# Patient Record
Sex: FEMALE | Race: WHITE | NOT HISPANIC OR LATINO | Employment: FULL TIME | URBAN - METROPOLITAN AREA
[De-identification: names, ages, dates, MRNs, and addresses within clinical notes are randomized per-mention and may not be internally consistent; named-entity substitution may affect disease eponyms.]

---

## 2017-03-19 ENCOUNTER — OFFICE VISIT (OUTPATIENT)
Dept: URGENT CARE | Facility: URGENT CARE | Age: 62
End: 2017-03-19
Payer: COMMERCIAL

## 2017-03-19 VITALS
SYSTOLIC BLOOD PRESSURE: 137 MMHG | DIASTOLIC BLOOD PRESSURE: 74 MMHG | TEMPERATURE: 101.1 F | OXYGEN SATURATION: 94 % | HEART RATE: 78 BPM

## 2017-03-19 DIAGNOSIS — R68.89 FLU-LIKE SYMPTOMS: Primary | ICD-10-CM

## 2017-03-19 LAB
DEPRECATED S PYO AG THROAT QL EIA: NORMAL
FLUAV+FLUBV AG SPEC QL: ABNORMAL
FLUAV+FLUBV AG SPEC QL: NEGATIVE
MICRO REPORT STATUS: NORMAL
SPECIMEN SOURCE: ABNORMAL
SPECIMEN SOURCE: NORMAL

## 2017-03-19 PROCEDURE — 87804 INFLUENZA ASSAY W/OPTIC: CPT | Performed by: FAMILY MEDICINE

## 2017-03-19 PROCEDURE — 87081 CULTURE SCREEN ONLY: CPT | Performed by: FAMILY MEDICINE

## 2017-03-19 PROCEDURE — 99203 OFFICE O/P NEW LOW 30 MIN: CPT | Performed by: FAMILY MEDICINE

## 2017-03-19 PROCEDURE — 87880 STREP A ASSAY W/OPTIC: CPT | Performed by: FAMILY MEDICINE

## 2017-03-19 RX ORDER — ONDANSETRON 4 MG/1
4 TABLET, FILM COATED ORAL EVERY 8 HOURS PRN
Qty: 18 TABLET | Refills: 0 | Status: SHIPPED | OUTPATIENT
Start: 2017-03-19

## 2017-03-19 RX ORDER — OSELTAMIVIR PHOSPHATE 75 MG/1
75 CAPSULE ORAL 2 TIMES DAILY
Qty: 10 CAPSULE | Refills: 0 | Status: SHIPPED | OUTPATIENT
Start: 2017-03-19

## 2017-03-19 NOTE — NURSING NOTE
"Chief Complaint   Patient presents with     Musculoskeletal Problem     Nausea, Fever, Cough, Chills       Initial /74 (BP Location: Right arm, Patient Position: Supine, Cuff Size: Adult Small)  Pulse 78  Temp 101.1  F (38.4  C) (Oral)  SpO2 94% Estimated body mass index is 18.01 kg/(m^2) as calculated from the following:    Height as of 5/28/08: 5' 7\" (1.702 m).    Weight as of 7/23/08: 115 lb (52.2 kg).  Medication Reconciliation: complete     Layne Ozuna CMA (AAMA)        "

## 2017-03-19 NOTE — PROGRESS NOTES
SUBJECTIVE:                                                    Mesha Knight is a 61 year old female who presents to clinic today for the following health issues:      RESPIRATORY SYMPTOMS      Duration: x5 days    Description  nasal congestion, cough, fever, chills, headache, fatigue/malaise, myalgias and nausea    Severity: severe    Accompanying signs and symptoms: None    History (predisposing factors):  none    Precipitating or alleviating factors: None    Therapies tried and outcome:             Problem list and histories reviewed & adjusted, as indicated.  Additional history: as documented    Patient Active Problem List   Diagnosis     iamOTHER BACK SYMPTOMS     Enthesopathy of knee     High cholesterol     Past Surgical History   Procedure Laterality Date     Hysterectomy         Social History   Substance Use Topics     Smoking status: Never Smoker     Smokeless tobacco: Not on file     Alcohol use 0.5 oz/week     1 drink(s) per week     Family History   Problem Relation Age of Onset     Obesity Mother       72     CEREBROVASCULAR DISEASE Father       94           Reviewed and updated as needed this visit by clinical staff  Allergies       Reviewed and updated as needed this visit by Provider         ROS:  Constitutional, HEENT, cardiovascular, pulmonary, gi and gu systems are negative, except as otherwise noted.    OBJECTIVE:                                                    /74 (BP Location: Right arm, Patient Position: Supine, Cuff Size: Adult Small)  Pulse 78  Temp 101.1  F (38.4  C) (Oral)  SpO2 94%  There is no height or weight on file to calculate BMI.  GENERAL: alert and moderate distress  HENT: normal cephalic/atraumatic, ear canals and TM's normal, nose and mouth without ulcers or lesions, oral mucous membranes moist and tonsillar erythema  NECK: bilateral anterior cervical adenopathy, no asymmetry, masses, or scars and thyroid normal to palpation  RESP: lungs clear to  auscultation - no rales, rhonchi or wheezes  CV: regular rate and rhythm, normal S1 S2, no S3 or S4, no murmur, click or rub, no peripheral edema and peripheral pulses strong  ABDOMEN: soft, nontender, no hepatosplenomegaly, no masses and bowel sounds normal  MS: no gross musculoskeletal defects noted, no edema    Diagnostic Test Results:  Results for orders placed or performed in visit on 03/19/17   Influenza A/B antigen   Result Value Ref Range    Influenza A/B Agn Specimen Nasal     Influenza A Negative NEG    Influenza B (A) NEG     Positive   Test results must be correlated with clinical data. If necessary, results   should be confirmed by a molecular assay or viral culture.     Rapid strep screen   Result Value Ref Range    Specimen Description Throat     Rapid Strep A Screen       NEGATIVE: No Group A streptococcal antigen detected by immunoassay, await   culture report.      Micro Report Status FINAL 03/19/2017           ASSESSMENT/PLAN:                                                              ICD-10-CM    1. Flu-like symptoms R68.89 Influenza A/B antigen     Rapid strep screen     Beta strep group A culture     ondansetron (ZOFRAN) 4 MG tablet     oseltamivir (TAMIFLU) 75 MG capsule       2. Nausea  3. Influenza B     61-year-old female moderately ill.  There  Are symptoms of bacterial infection but at this time I would only treat as flu and not secondary infection.    She is still having nausea and I would treat this symptom.            Positive for influenza B.    Xavier Aguirre MD  [facility]

## 2017-03-19 NOTE — MR AVS SNAPSHOT
"              After Visit Summary   3/19/2017    Mesha Knight    MRN: [id number]           Patient Information     Date Of Birth          1955        Visit Information        Provider Department      3/19/2017 9:30 AM Xavier Aguirre MD [facility]        Today's Diagnoses     Flu-like symptoms    -  1       Follow-ups after your visit        Who to contact     If you have questions or need follow up information about today's clinic visit or your schedule please contact [facility] directly at [phone].  Normal or non-critical lab and imaging results will be communicated to you by [employer], letter or phone within 4 business days after the clinic has received the results. If you do not hear from us within 7 days, please contact the clinic through [employer] or phone. If you have a critical or abnormal lab result, we will notify you by phone as soon as possible.  Submit refill requests through [employer] or call your pharmacy and they will forward the refill request to us. Please allow 3 business days for your refill to be completed.          Additional Information About Your Visit        MyChart Information     [employer] lets you send messages to your doctor, view your test results, renew your prescriptions, schedule appointments and more. To sign up, go to [email or web address] . Click on \"Log in\" on the left side of the screen, which will take you to the Welcome page. Then click on \"Sign up Now\" on the right side of the page.     You will be asked to enter the access code listed below, as well as some personal information. Please follow the directions to create your username and password.     Your access code is: [email or web address]  Expires: 2017  6:04 PM     Your access code will  in 90 days. If you need help or a new code, please call your [place] clinic or [phone].        Care EveryWhere ID     This is your Care EveryWhere ID. This could be used by other " organizations to access your [facility] medical records  [id number]        Your Vitals Were     Pulse Temperature Pulse Oximetry             78 101.1  F (38.4  C) (Oral) 94%          Blood Pressure from Last 3 Encounters:   03/19/17 137/74   07/23/08 108/62   05/28/08 110/70    Weight from Last 3 Encounters:   07/23/08 115 lb (52.2 kg)   05/28/08 115 lb (52.2 kg)              We Performed the Following     Beta strep group A culture     Influenza A/B antigen     Rapid strep screen          Today's Medication Changes          These changes are accurate as of: 3/19/17 11:59 PM.  If you have any questions, ask your nurse or doctor.               Start taking these medicines.        Dose/Directions    ondansetron 4 MG tablet   Commonly known as:  ZOFRAN   Used for:  Flu-like symptoms        Dose:  4 mg   Take 1 tablet (4 mg) by mouth every 8 hours as needed for nausea   Quantity:  18 tablet   Refills:  0       oseltamivir 75 MG capsule   Commonly known as:  TAMIFLU   Used for:  Flu-like symptoms        Dose:  75 mg   Take 1 capsule (75 mg) by mouth 2 times daily   Quantity:  10 capsule   Refills:  0            Where to get your medicines      These medications were sent to [place] [address] [place] [facility] [address]  [address] [facility] [address]     Phone:  [phone]     ondansetron 4 MG tablet    oseltamivir 75 MG capsule                Primary Care Provider Office Phone # Fax #    Pawan Sims [phone]       [address] [address]        Thank you!     Thank you for choosing [facility]  for your care. Our goal is always to provide you with excellent care. Hearing back from our patients is one way we can continue to improve our services. Please take a few minutes to complete the written survey that you may receive in the mail after your visit with us. Thank you!             Your Updated Medication List -  Protect others around you: Learn how to safely use, store and throw away your medicines at www.disposemymeds.org.          This list is accurate as of: 3/19/17 11:59 PM.  Always use your most recent med list.                   Brand Name Dispense Instructions for use    CITRUCEL PO      1 TABLET TWICE DAILY AS NEEDED       GLUCOSAMINE CHONDROITIN Tabs      2 tabs per day       LYSINE      2 tabs per day       MULTI-VITAMIN PO      1 TABLET DAILY       OMEGA-3 CAPS   OR      2 tabs per day       ondansetron 4 MG tablet    ZOFRAN    18 tablet    Take 1 tablet (4 mg) by mouth every 8 hours as needed for nausea       oseltamivir 75 MG capsule    TAMIFLU    10 capsule    Take 1 capsule (75 mg) by mouth 2 times daily       RESTASIS OP      None Entered       TUMS PO      4 tabs bid       ZOLOFT PO      75mg per day

## 2017-03-21 LAB
BACTERIA SPEC CULT: NORMAL
MICRO REPORT STATUS: NORMAL
SPECIMEN SOURCE: NORMAL

## 2017-09-29 ENCOUNTER — HOSPITAL ENCOUNTER (OUTPATIENT)
Dept: MAMMOGRAPHY | Facility: CLINIC | Age: 62
Discharge: HOME | End: 2017-09-29
Attending: OBSTETRICS & GYNECOLOGY | Admitting: OBSTETRICS & GYNECOLOGY
Payer: COMMERCIAL

## 2017-09-29 DIAGNOSIS — Z12.31 VISIT FOR SCREENING MAMMOGRAM: ICD-10-CM

## 2017-09-29 PROCEDURE — 77063 BREAST TOMOSYNTHESIS BI: CPT

## 2017-09-29 PROCEDURE — G0202 SCR MAMMO BI INCL CAD: HCPCS

## 2018-10-08 ENCOUNTER — HOSPITAL ENCOUNTER (OUTPATIENT)
Dept: MAMMOGRAPHY | Facility: CLINIC | Age: 63
Discharge: HOME | End: 2018-10-08
Attending: OBSTETRICS & GYNECOLOGY | Admitting: OBSTETRICS & GYNECOLOGY
Payer: COMMERCIAL

## 2018-10-08 DIAGNOSIS — Z12.31 VISIT FOR SCREENING MAMMOGRAM: ICD-10-CM

## 2018-10-08 PROCEDURE — 77063 BREAST TOMOSYNTHESIS BI: CPT

## 2019-07-06 ENCOUNTER — OFFICE VISIT (OUTPATIENT)
Dept: URGENT CARE | Facility: URGENT CARE | Age: 64
End: 2019-07-06
Payer: COMMERCIAL

## 2019-07-06 VITALS
OXYGEN SATURATION: 99 % | SYSTOLIC BLOOD PRESSURE: 112 MMHG | DIASTOLIC BLOOD PRESSURE: 72 MMHG | TEMPERATURE: 98 F | HEART RATE: 78 BPM

## 2019-07-06 DIAGNOSIS — R31.29 OTHER MICROSCOPIC HEMATURIA: ICD-10-CM

## 2019-07-06 DIAGNOSIS — N30.01 ACUTE CYSTITIS WITH HEMATURIA: Primary | ICD-10-CM

## 2019-07-06 LAB
ALBUMIN UR-MCNC: NEGATIVE MG/DL
APPEARANCE UR: CLEAR
BACTERIA #/AREA URNS HPF: ABNORMAL /HPF
BILIRUB UR QL STRIP: NEGATIVE
COLOR UR AUTO: YELLOW
GLUCOSE UR STRIP-MCNC: NEGATIVE MG/DL
HGB UR QL STRIP: ABNORMAL
KETONES UR STRIP-MCNC: NEGATIVE MG/DL
LEUKOCYTE ESTERASE UR QL STRIP: ABNORMAL
NITRATE UR QL: NEGATIVE
PH UR STRIP: 6.5 PH (ref 5–7)
RBC #/AREA URNS AUTO: ABNORMAL /HPF
SOURCE: ABNORMAL
SP GR UR STRIP: 1.01 (ref 1–1.03)
UROBILINOGEN UR STRIP-ACNC: 0.2 EU/DL (ref 0.2–1)
WBC #/AREA URNS AUTO: ABNORMAL /HPF

## 2019-07-06 PROCEDURE — 99213 OFFICE O/P EST LOW 20 MIN: CPT | Performed by: PHYSICIAN ASSISTANT

## 2019-07-06 PROCEDURE — 81001 URINALYSIS AUTO W/SCOPE: CPT | Performed by: PHYSICIAN ASSISTANT

## 2019-07-06 RX ORDER — SULFAMETHOXAZOLE/TRIMETHOPRIM 800-160 MG
1 TABLET ORAL 2 TIMES DAILY
Qty: 6 TABLET | Refills: 0 | Status: SHIPPED | OUTPATIENT
Start: 2019-07-06 | End: 2019-07-09

## 2019-07-06 NOTE — PATIENT INSTRUCTIONS
Follow up with primary in 3 days if not improving.      Patient Education     Dysuria with Uncertain Cause (Adult)    The urethra is the tube that allows urine to pass out of the body. In a woman, the urethra is the opening above the vagina. In men, the urethra is the opening on the tip of the penis. Dysuria is the feeling of pain or burning in the urethra when passing urine.  Dysuria can be caused by anything that irritates or inflames the urethra. An infection or chemical irritation can cause this reaction. A bladder infection is the most common cause of dysuria in adults. A urine test can diagnose this. A bladder infection needs antibiotic treatment.  Soaps, lotions, colognes and feminine hygiene products can cause dysuria. So can birth control jellies, creams, and foams. It will go away 1 to 3 days after using these irritants.  Sexually transmitted diseases (STDs) such as chlamydia or gonorrhea can cause dysuria. Your healthcare provider may take a culture sample. Your provider may start you on antibiotic medicine before the culture test returns.  In women who have gone through menopause, dysuria can be from dryness in the lining of the urethra. This can be treated with hormones. Dysuria becomes long-term (chronic) when it lasts for weeks or months. You may need to see a specialist (urologist) to diagnose and treat chronic dysuria.  Home care  These home care tips may help:    Don't use any chemicals or products that you think may be causing your symptoms.    If you were given a prescription medicine, take as directed. Be sure to take it until it is all used up.    If a culture was taken, don't have sex until you have been told that it is negative. This means you don't have an infection. Then follow your healthcare provider's advice to treat your condition.  If a culture was done and it is positive:    Both you and your sexual partner may need to be treated. This is true even if your partner has no  symptoms.    Contact your healthcare provider or go to an urgent care clinic or the public health department to be looked at and treated.    Don't have sex until both you and your partner(s) have finished all antibiotics and your healthcare provider says you are no longer contagious.    Learn about and use safe sex practices. The safest sex is with a partner who has tested negative and only has sex with you. Condoms can prevent STDs from spreading, but they aren't a guarantee.  Follow-up care  Follow up with your healthcare provider, or as advised. If a culture was taken, you may call as directed for the results. If you have an STD, follow up with your provider or the public health department for a complete STD screening, including HIV testing. For more information, contact CDC-INFO at 820-214-0444.  When to seek medical advice  Call your healthcare provider right away if any of these occur:    You aren't better after 3 days of treatment    Fever of 100.4 F (38 C) or higher, or as directed by your healthcare provider    Back or belly pain that gets worse    You can't urinate because of pain    New discharge from the urethra, vagina, or penis    Painful sores on the penis    Rash or joint pain    Painful lumps (lymph nodes) in the groin    Testicle pain or swelling of the scrotum  Date Last Reviewed: 11/1/2016 2000-2018 [employer]. [address] [place] [address], [place] [address]. All rights reserved. This information is not intended as a substitute for professional medical care. Always follow your healthcare professional's instructions.

## 2019-07-06 NOTE — PROGRESS NOTES
SUBJECTIVE:   Mesha Knight is a 64 year old female who  presents today for a possible UTI. Symptoms of urgency and burning have been going on for 1week(s).  Hematuria yes mild.  gradual onsetand mild.  There is no history of fever, chills, nausea or vomiting.  No history of vaginal or penile discharge. This patient does have a history of urinary tract infections. Patient denies long duration, rigors, flank pain, temperature > 101 degrees F., Vomiting, significant nausea or diarrhea, taking Coumadin and GFR less than 30 within the last year or vaginal discharge, vaginal odor and vaginal itching     Past Medical History:   Diagnosis Date     High cholesterol      High potassium      Current Outpatient Medications   Medication Sig Dispense Refill     CITRUCEL OR 1 TABLET TWICE DAILY AS NEEDED       GLUCOSAMINE CHONDROITIN OR TABS 2 tabs per day       MULTI-VITAMIN OR 1 TABLET DAILY       OMEGA-3 CAPS   OR 2 tabs per day       RESTASIS OP None Entered       TUMS OR 4 tabs bid       vitamin D3 (CHOLECALCIFEROL) 1000 units (25 mcg) tablet Take by mouth daily       ZOLOFT OR 75mg per day       LYSINE 2 tabs per day       ondansetron (ZOFRAN) 4 MG tablet Take 1 tablet (4 mg) by mouth every 8 hours as needed for nausea (Patient not taking: Reported on 7/6/2019) 18 tablet 0     oseltamivir (TAMIFLU) 75 MG capsule Take 1 capsule (75 mg) by mouth 2 times daily (Patient not taking: Reported on 7/6/2019) 10 capsule 0     Social History     Tobacco Use     Smoking status: Never Smoker   Substance Use Topics     Alcohol use: Yes     Alcohol/week: 0.5 oz     Types: 1 drink(s) per week       ROS:   10 point ROS negative except as listed above      OBJECTIVE:  /72 (BP Location: Right arm, Patient Position: Chair, Cuff Size: Adult Regular)   Pulse 78   Temp 98  F (36.7  C) (Oral)   SpO2 99%   GENERAL APPEARANCE: healthy, alert and no distress  RESP: lungs clear to auscultation - no rales, rhonchi or wheezes  CV: regular  rates and rhythm, normal S1 S2, no murmur noted  ABDOMEN:  soft, nontender, no HSM or masses and bowel sounds normal  BACK: No CVA tenderness  SKIN: no suspicious lesions or rashes    Results for orders placed or performed in visit on 07/06/19   *UA reflex to Microscopic and Culture ([place] and [facility] (except [place] and [place])   Result Value Ref Range    Color Urine Yellow     Appearance Urine Clear     Glucose Urine Negative NEG^Negative mg/dL    Bilirubin Urine Negative NEG^Negative    Ketones Urine Negative NEG^Negative mg/dL    Specific Gravity Urine 1.010 1.003 - 1.035    Blood Urine Moderate (A) NEG^Negative    pH Urine 6.5 5.0 - 7.0 pH    Protein Albumin Urine Negative NEG^Negative mg/dL    Urobilinogen Urine 0.2 0.2 - 1.0 EU/dL    Nitrite Urine Negative NEG^Negative    Leukocyte Esterase Urine Trace (A) NEG^Negative    Source Midstream Urine    Urine Microscopic   Result Value Ref Range    WBC Urine 5-10 (A) OTO5^0 - 5 /HPF    RBC Urine 2-5 (A) OTO2^O - 2 /HPF    Bacteria Urine Few (A) NEG^Negative /HPF         ASSESSMENT:   (N30.01) Acute cystitis with hematuria  (primary encounter diagnosis)  Plan: *UA reflex to Microscopic and Culture ([place]         and [facility] (except [place] and         [place]), Urine Microscopic,         sulfamethoxazole-trimethoprim (BACTRIM         DS/SEPTRA DS) 800-160 MG tablet      (R31.29) Other microscopic hematuria  Plan: sulfamethoxazole-trimethoprim (BACTRIM         DS/SEPTRA DS) 800-160 MG tablet      Patient Instructions   Follow up with primary in 3 days if not improving.      Patient Education     Dysuria with Uncertain Cause (Adult)    The urethra is the tube that allows urine to pass out of the body. In a woman, the urethra is the opening above the vagina. In men, the urethra is the opening on the tip of the penis. Dysuria is the feeling of pain or burning in the urethra when passing urine.  Dysuria can be caused by anything that irritates  or inflames the urethra. An infection or chemical irritation can cause this reaction. A bladder infection is the most common cause of dysuria in adults. A urine test can diagnose this. A bladder infection needs antibiotic treatment.  Soaps, lotions, colognes and feminine hygiene products can cause dysuria. So can birth control jellies, creams, and foams. It will go away 1 to 3 days after using these irritants.  Sexually transmitted diseases (STDs) such as chlamydia or gonorrhea can cause dysuria. Your healthcare provider may take a culture sample. Your provider may start you on antibiotic medicine before the culture test returns.  In women who have gone through menopause, dysuria can be from dryness in the lining of the urethra. This can be treated with hormones. Dysuria becomes long-term (chronic) when it lasts for weeks or months. You may need to see a specialist (urologist) to diagnose and treat chronic dysuria.  Home care  These home care tips may help:    Don't use any chemicals or products that you think may be causing your symptoms.    If you were given a prescription medicine, take as directed. Be sure to take it until it is all used up.    If a culture was taken, don't have sex until you have been told that it is negative. This means you don't have an infection. Then follow your healthcare provider's advice to treat your condition.  If a culture was done and it is positive:    Both you and your sexual partner may need to be treated. This is true even if your partner has no symptoms.    Contact your healthcare provider or go to an urgent care clinic or the public health department to be looked at and treated.    Don't have sex until both you and your partner(s) have finished all antibiotics and your healthcare provider says you are no longer contagious.    Learn about and use safe sex practices. The safest sex is with a partner who has tested negative and only has sex with you. Condoms can prevent STDs from  spreading, but they aren't a guarantee.  Follow-up care  Follow up with your healthcare provider, or as advised. If a culture was taken, you may call as directed for the results. If you have an STD, follow up with your provider or the public health department for a complete STD screening, including HIV testing. For more information, contact CDC-INFO at 434-187-9154.  When to seek medical advice  Call your healthcare provider right away if any of these occur:    You aren't better after 3 days of treatment    Fever of 100.4 F (38 C) or higher, or as directed by your healthcare provider    Back or belly pain that gets worse    You can't urinate because of pain    New discharge from the urethra, vagina, or penis    Painful sores on the penis    Rash or joint pain    Painful lumps (lymph nodes) in the groin    Testicle pain or swelling of the scrotum  Date Last Reviewed: 11/1/2016 2000-2018 [employer]. [address] [place] [address]. All rights reserved. This information is not intended as a substitute for professional medical care. Always follow your healthcare professional's instructions.                             Patient Instructions   Follow up with primary in 3 days if not improving.      Patient Education     Dysuria with Uncertain Cause (Adult)    The urethra is the tube that allows urine to pass out of the body. In a woman, the urethra is the opening above the vagina. In men, the urethra is the opening on the tip of the penis. Dysuria is the feeling of pain or burning in the urethra when passing urine.  Dysuria can be caused by anything that irritates or inflames the urethra. An infection or chemical irritation can cause this reaction. A bladder infection is the most common cause of dysuria in adults. A urine test can diagnose this. A bladder infection needs antibiotic treatment.  Soaps, lotions, colognes and feminine hygiene products can cause dysuria. So can birth control jellies,  creams, and foams. It will go away 1 to 3 days after using these irritants.  Sexually transmitted diseases (STDs) such as chlamydia or gonorrhea can cause dysuria. Your healthcare provider may take a culture sample. Your provider may start you on antibiotic medicine before the culture test returns.  In women who have gone through menopause, dysuria can be from dryness in the lining of the urethra. This can be treated with hormones. Dysuria becomes long-term (chronic) when it lasts for weeks or months. You may need to see a specialist (urologist) to diagnose and treat chronic dysuria.  Home care  These home care tips may help:    Don't use any chemicals or products that you think may be causing your symptoms.    If you were given a prescription medicine, take as directed. Be sure to take it until it is all used up.    If a culture was taken, don't have sex until you have been told that it is negative. This means you don't have an infection. Then follow your healthcare provider's advice to treat your condition.  If a culture was done and it is positive:    Both you and your sexual partner may need to be treated. This is true even if your partner has no symptoms.    Contact your healthcare provider or go to an urgent care clinic or the public health department to be looked at and treated.    Don't have sex until both you and your partner(s) have finished all antibiotics and your healthcare provider says you are no longer contagious.    Learn about and use safe sex practices. The safest sex is with a partner who has tested negative and only has sex with you. Condoms can prevent STDs from spreading, but they aren't a guarantee.  Follow-up care  Follow up with your healthcare provider, or as advised. If a culture was taken, you may call as directed for the results. If you have an STD, follow up with your provider or the public health department for a complete STD screening, including HIV testing. For more information,  contact CDC-INFO at 684-626-6263.  When to seek medical advice  Call your healthcare provider right away if any of these occur:    You aren't better after 3 days of treatment    Fever of 100.4 F (38 C) or higher, or as directed by your healthcare provider    Back or belly pain that gets worse    You can't urinate because of pain    New discharge from the urethra, vagina, or penis    Painful sores on the penis    Rash or joint pain    Painful lumps (lymph nodes) in the groin    Testicle pain or swelling of the scrotum  Date Last Reviewed: 11/1/2016 2000-2018 [employer]. [address] [place], [place] [address]. All rights reserved. This information is not intended as a substitute for professional medical care. Always follow your healthcare professional's instructions.

## 2019-10-14 ENCOUNTER — HOSPITAL ENCOUNTER (OUTPATIENT)
Dept: MAMMOGRAPHY | Facility: CLINIC | Age: 64
Discharge: HOME | End: 2019-10-14
Attending: OBSTETRICS & GYNECOLOGY | Admitting: OBSTETRICS & GYNECOLOGY
Payer: COMMERCIAL

## 2019-10-14 DIAGNOSIS — Z12.31 VISIT FOR SCREENING MAMMOGRAM: ICD-10-CM

## 2019-10-14 PROCEDURE — 77063 BREAST TOMOSYNTHESIS BI: CPT

## 2019-10-22 ENCOUNTER — HOSPITAL ENCOUNTER (OUTPATIENT)
Dept: ULTRASOUND IMAGING | Facility: CLINIC | Age: 64
Discharge: HOME | End: 2019-10-22
Attending: OBSTETRICS & GYNECOLOGY | Admitting: OBSTETRICS & GYNECOLOGY
Payer: COMMERCIAL

## 2019-10-22 DIAGNOSIS — R92.8 ABNORMAL MAMMOGRAM: ICD-10-CM

## 2019-10-22 PROCEDURE — 76642 ULTRASOUND BREAST LIMITED: CPT | Mod: LT

## 2020-12-03 ENCOUNTER — HOSPITAL ENCOUNTER (OUTPATIENT)
Dept: MAMMOGRAPHY | Facility: CLINIC | Age: 65
Discharge: HOME | End: 2020-12-03
Attending: OBSTETRICS & GYNECOLOGY | Admitting: OBSTETRICS & GYNECOLOGY
Payer: COMMERCIAL

## 2020-12-03 DIAGNOSIS — Z12.31 VISIT FOR SCREENING MAMMOGRAM: ICD-10-CM

## 2020-12-03 PROCEDURE — 77067 SCR MAMMO BI INCL CAD: CPT

## 2021-12-22 ENCOUNTER — HOSPITAL ENCOUNTER (OUTPATIENT)
Dept: MAMMOGRAPHY | Facility: CLINIC | Age: 66
Discharge: HOME | End: 2021-12-22
Attending: OBSTETRICS & GYNECOLOGY | Admitting: OBSTETRICS & GYNECOLOGY
Payer: COMMERCIAL

## 2021-12-22 DIAGNOSIS — Z12.31 VISIT FOR SCREENING MAMMOGRAM: ICD-10-CM

## 2021-12-22 PROCEDURE — 77063 BREAST TOMOSYNTHESIS BI: CPT

## 2021-12-31 ENCOUNTER — HOSPITAL ENCOUNTER (OUTPATIENT)
Dept: MAMMOGRAPHY | Facility: CLINIC | Age: 66
End: 2021-12-31
Attending: OBSTETRICS & GYNECOLOGY
Payer: COMMERCIAL

## 2021-12-31 ENCOUNTER — HOSPITAL ENCOUNTER (OUTPATIENT)
Dept: ULTRASOUND IMAGING | Facility: CLINIC | Age: 66
End: 2021-12-31
Attending: OBSTETRICS & GYNECOLOGY
Payer: COMMERCIAL

## 2021-12-31 DIAGNOSIS — R92.8 ABNORMAL MAMMOGRAM: ICD-10-CM

## 2021-12-31 PROCEDURE — 76642 ULTRASOUND BREAST LIMITED: CPT | Mod: RT

## 2021-12-31 PROCEDURE — 77061 BREAST TOMOSYNTHESIS UNI: CPT | Mod: RT

## 2022-09-28 ENCOUNTER — OFFICE VISIT (OUTPATIENT)
Dept: PLASTIC SURGERY | Facility: CLINIC | Age: 67
End: 2022-09-28
Payer: COMMERCIAL

## 2022-09-28 DIAGNOSIS — Z41.1 ENCOUNTER FOR COSMETIC PROCEDURE: Primary | ICD-10-CM

## 2022-09-28 NOTE — LETTER
9/28/2022       RE: Mesha Knight  [address]  [facility] [address]     Dear Colleague,    Thank you for referring your patient, Mesha Knight, to the [facility]. Please see a copy of my visit note below.    This office note has been dictated.       Again, thank you for allowing me to participate in the care of your patient.      Sincerely,    JAVIER MURRAY MD

## 2022-09-28 NOTE — LETTER
Date:September 28, 2022      Provider requested that no letter be sent. Do not send.       [facility]

## 2022-09-28 NOTE — LETTER
September 28, 2022  Re: Mesha LEWIS Eugene  1955    Dear Dr. Parkinson,    Thank you so much for referring Mesha LEWIS Katepool to the [facility]. I had the pleasure of visiting with Mesha today.     Attached you will find a copy of my note. Please feel free to reach out to me with any questions, [phone].     This office note has been dictated.         Your trust in our practice and care is much appreciated.    Sincerely,  JAVIER MURRAY MD

## 2022-09-28 NOTE — LETTER
Date:September 28, 2022      Provider requested that no letter be sent. Do not send.       [facility]

## 2022-10-02 NOTE — PROGRESS NOTES
Service Date: 09/28/2022    REASON FOR VISIT: Hilary is in to see us today for evaluation of facial aging.  She sees Susan Valdes.  She is troubled by a tired, aging appearance.      PAST SURGICAL HISTORY:   1) She has had her eyelids done in 2013 with Dr. Pagan.    2) She had a rhinoplasty in 1979.    3) She had TMJ Surgery in 1987.    4) She also had a maxillary osteotomy with shortening of the maxilla in 1989 and since then, her upper teeth have been hooded when she smiles.    5) She has had bunion and hammer toe surgery.   6) Hysterectomy.      PAST MEDICAL HISTORY: She has no chronic medical conditions.      MEDICATIONS:    1) Restasis for dry eye.    2) Sertraline for anxiety with good effect.    SOCIAL HISTORY: She lives alone so we talked about having a responsible adult with her.      HABITS:  She is a nonsmoker.      ALLERGIES:  She has no known allergies.      PHYSICAL EXAMINATION: She would like more gum show with smiling.  She notices pseudo fat herniation of the lower lids.  She has significant midface dissent.  She has platysmal banding anteriorly.      RECOMMENDATIONS/PLAN: We talked about a facelift.  I do not think she needs much submental work except a small amount of liposuction, a facelift and I would use a modified brow lift.  She has fairly thin hair and a high hairline so I want to be cautious about having incisions show with her having a higher hairline.  Subnasal lip lift would be added.  The risks and benefits of these surgeries were discussed at length including rare complications, motor and sensory nerve issues, infections, scarring, inadequate correction, etc.  If she wants to proceed, I would be happy to help her.    Mesfin Hurst M.D.            September 28, 2022      Susan Valdes RN, CPSN, CANS   [facility]   [address] [facility]   [address] [place] [address]       Dear Susan,    I saw Hilary today.  We talked about subnasal lip lift as she has had a  maxillary shortening operation and a facelift with a modified brow lift.  I told her that working with you longterm is still going to be exceedingly valuable for her.  If she elects to have surgery, I would be sure to let you know of her outcome.  I appreciate seeing her.      Sincerely,            Mesfin Hurst MD        D: 10/01/2022   T: 10/02/2022   MT: ms    Name:     JUANIS MCKEONJhony  MRN:      [id number]        Account:      [id number]   :      1955           Service Date: 2022       Document: [id number]

## 2022-12-15 DIAGNOSIS — Z98.890 POSTOPERATIVE STATE: Primary | ICD-10-CM

## 2022-12-15 RX ORDER — ONDANSETRON 4 MG/1
4 TABLET, ORALLY DISINTEGRATING ORAL EVERY 8 HOURS PRN
Qty: 8 TABLET | Refills: 0 | Status: SHIPPED | OUTPATIENT
Start: 2022-12-15

## 2022-12-15 RX ORDER — OXYCODONE HYDROCHLORIDE 5 MG/1
5 TABLET ORAL EVERY 6 HOURS PRN
Qty: 12 TABLET | Refills: 0 | Status: SHIPPED | OUTPATIENT
Start: 2022-12-15 | End: 2022-12-18

## 2022-12-21 ENCOUNTER — TRANSFERRED RECORDS (OUTPATIENT)
Dept: PLASTIC SURGERY | Facility: CLINIC | Age: 67
End: 2022-12-21

## 2022-12-22 ENCOUNTER — OFFICE VISIT (OUTPATIENT)
Dept: PLASTIC SURGERY | Facility: CLINIC | Age: 67
End: 2022-12-22
Payer: COMMERCIAL

## 2022-12-22 DIAGNOSIS — Z98.890 POSTOPERATIVE STATE: Primary | ICD-10-CM

## 2022-12-22 NOTE — LETTER
Date:December 22, 2022      Provider requested that no letter be sent. Do not send.       [facility]

## 2022-12-22 NOTE — LETTER
12/22/2022       RE: Mesha Knight  [address]  [facility] [address]     Dear Colleague,    Thank you for referring your patient, Mesha Knight, to the [facility] at [facility]. Please see a copy of my visit note below.    This office note has been dictated.       Again, thank you for allowing me to participate in the care of your patient.      Sincerely,    JAVIER MURRAY MD

## 2022-12-22 NOTE — LETTER
December 22, 2022  Re: Mesha LEWIS Eugene  1955    Dear Dr. Parkinson,    Thank you so much for referring Mesha LEWIS Katepool to the [facility]. I had the pleasure of visiting with Mesha today.     Attached you will find a copy of my note. Please feel free to reach out to me with any questions, [phone].     This office note has been dictated.         Your trust in our practice and care is much appreciated.    Sincerely,  JAVIER MURRAY MD

## 2022-12-22 NOTE — LETTER
Date:December 22, 2022      Provider requested that no letter be sent. Do not send.       [facility]

## 2022-12-23 NOTE — PROGRESS NOTES
Service Date: 2022    HISTORY OF PRESENT ILLNESS: Hilary is back today after her facelift.  She has had very little drainage.  She had a fair amount of nausea and vomiting.  This is likely related to her brow lift.      PHYSICAL EXAMINATION: The incisions look great.  The flaps look great.  Her brows are in good shape.  Her facial contour is excellent.  She has very little bruising.  Her neck contour is terrific.  All branches of the facial nerve work.      ASSESSMENT AND PLAN: A jaw bra was placed.  She can take that off tomorrow.  She will see us next week for suture removal.      Mesfin Hurst MD        D: 2022   T: 2022   MT: ms    Name:     JUANIS MCKEON  MRN:      [id number]        Account:      [id number]   :      1955           Service Date: 2022       Document: [id number]

## 2022-12-28 ENCOUNTER — ALLIED HEALTH/NURSE VISIT (OUTPATIENT)
Dept: PLASTIC SURGERY | Facility: CLINIC | Age: 67
End: 2022-12-28
Payer: COMMERCIAL

## 2022-12-28 DIAGNOSIS — Z98.890 POSTOPERATIVE STATE: Primary | ICD-10-CM

## 2022-12-28 NOTE — PROGRESS NOTES
The patient was seen at one week post op following facial rejuvenation surgery with Dr. Hurst. She had minimal bruising and swelling. She denied pain. Her incisions appeared intact, clean, and well approximated.  I cleaned all of her suture lines. I removed all of her staples and the majority of her sutures without difficulty. Some sutures were left around her bilateral ears per Dr. Hurst's request. These will be removed in a week when she returns to clinic. Her incisions were cleaned and aquaphor was applied.     Mesha and SRINIVAS reviewed post op care and all questions were answered. She was instructed to call with any concerns.

## 2023-01-04 ENCOUNTER — ALLIED HEALTH/NURSE VISIT (OUTPATIENT)
Dept: PLASTIC SURGERY | Facility: CLINIC | Age: 68
End: 2023-01-04
Payer: COMMERCIAL

## 2023-01-04 DIAGNOSIS — Z98.890 POSTOPERATIVE STATE: Primary | ICD-10-CM

## 2023-01-04 DIAGNOSIS — R11.0 NAUSEA: Primary | ICD-10-CM

## 2023-01-04 RX ORDER — ONDANSETRON 4 MG/1
4 TABLET, ORALLY DISINTEGRATING ORAL EVERY 8 HOURS PRN
Qty: 8 TABLET | Refills: 0 | Status: SHIPPED | OUTPATIENT
Start: 2023-01-04 | End: 2023-01-07

## 2023-01-04 NOTE — PROGRESS NOTES
Chantelle was seen for her 2 week post op appt following facial rejuvenation surgery with Dr. Hurst. Her bruising and swelling continues to improve. She denied pain. Her incisions all appeared intact, clean, and well approximated. I cleaned all suture lines and removed the remainder of the sutures without difficulty. Chantelle was given a couple breaks during this as she gets nausea and dizziness with medical procedures. The incisions were cleaned again and aquaphor applied. I sent a refill for zofan per request. Chantelle was feeling well and steady when she left the clinic. She will follow up in 6 weeks.

## 2023-02-20 ENCOUNTER — HOSPITAL ENCOUNTER (OUTPATIENT)
Dept: MAMMOGRAPHY | Facility: CLINIC | Age: 68
Discharge: HOME | End: 2023-02-20
Attending: OBSTETRICS & GYNECOLOGY | Admitting: OBSTETRICS & GYNECOLOGY
Payer: COMMERCIAL

## 2023-02-20 DIAGNOSIS — Z12.31 VISIT FOR SCREENING MAMMOGRAM: ICD-10-CM

## 2023-02-20 PROCEDURE — 77067 SCR MAMMO BI INCL CAD: CPT

## 2023-03-08 ENCOUNTER — OFFICE VISIT (OUTPATIENT)
Dept: PLASTIC SURGERY | Facility: CLINIC | Age: 68
End: 2023-03-08
Payer: COMMERCIAL

## 2023-03-08 DIAGNOSIS — Z98.890 POSTOPERATIVE STATE: Primary | ICD-10-CM

## 2023-03-08 NOTE — LETTER
3/8/2023       RE: Juanis Knight  [address]  [facility] [address]     Dear Colleague,    Thank you for referring your patient, Juanis Knight, to the [facility] at [facility]. Please see a copy of my visit note below.    This office note has been dictated.     Service Date: 2023    HISTORY OF PRESENT ILLNESS: Hilary is in today.  She loves her result.  Her subnasal lip lift has provided a nice improvement.     PHYSICAL EXAMINATION: She still has some platysmal banding which gets injected with botox.  She has a nice midface contour.  Her brows look good.  She had a little suture reaction in the right paramedial brow incision.  A small microsuture was removed from there today.      RECOMMENDATIONS/PLAN: Overall, she has a terrific result.  She would like to have some fat transfer to the mid face, particularly along the face of the maxilla.  I told her with the edema she has, it is much too soon to make a judgement on that.  It is something she might consider next winter.  She will see me in the fall.      Mesfin Hrust MD        D: 2023   T: 2023   MT: ms    Name:     JUANIS KNIGHT  MRN:      [id number]        Account:      [id number]   :      1955           Service Date: 2023       Document: [id number]

## 2023-03-08 NOTE — LETTER
March 10, 2023  Re: Juanis Knight  1955      Thank you so much for referring Juanis Knight to the [facility]. I had the pleasure of visiting with Juanis today.     Attached you will find a copy of my note. Please feel free to reach out to me with any questions, [phone].     This office note has been dictated.     Service Date: 2023    HISTORY OF PRESENT ILLNESS: Hilary is in today.  She loves her result.  Her subnasal lip lift has provided a nice improvement.     PHYSICAL EXAMINATION: She still has some platysmal banding which gets injected with botox.  She has a nice midface contour.  Her brows look good.  She had a little suture reaction in the right paramedial brow incision.  A small microsuture was removed from there today.      RECOMMENDATIONS/PLAN: Overall, she has a terrific result.  She would like to have some fat transfer to the mid face, particularly along the face of the maxilla.  I told her with the edema she has, it is much too soon to make a judgement on that.  It is something she might consider next winter.  She will see me in the fall.      Mesfin Hurst MD        D: 2023   T: 2023   MT: ms    Name:     JUANIS KNIGHT  MRN:      [id number]        Account:      [id number]   :      1955           Service Date: 2023     Document: [id number]

## 2023-03-09 NOTE — PROGRESS NOTES
Service Date: 2023    HISTORY OF PRESENT ILLNESS: Hilary is in today.  She loves her result.  Her subnasal lip lift has provided a nice improvement.     PHYSICAL EXAMINATION: She still has some platysmal banding which gets injected with botox.  She has a nice midface contour.  Her brows look good.  She had a little suture reaction in the right paramedial brow incision.  A small microsuture was removed from there today.      RECOMMENDATIONS/PLAN: Overall, she has a terrific result.  She would like to have some fat transfer to the mid face, particularly along the face of the maxilla.  I told her with the edema she has, it is much too soon to make a judgement on that.  It is something she might consider next winter.  She will see me in the fall.      Mesfin Hurst MD        D: 2023   T: 2023   MT: ms    Name:     JUANIS MCKEON  MRN:      [id number]        Account:      [id number]   :      1955           Service Date: 2023       Document: [id number]

## 2023-03-13 ENCOUNTER — OFFICE VISIT (OUTPATIENT)
Dept: PLASTIC SURGERY | Facility: CLINIC | Age: 68
End: 2023-03-13
Payer: COMMERCIAL

## 2023-03-13 DIAGNOSIS — Z41.1 ENCOUNTER FOR COSMETIC PROCEDURE: Primary | ICD-10-CM

## 2023-03-13 NOTE — NURSING NOTE
Pt had a Facelift, Modified Brow Lift and Subnasal Lip Lift with Dr. Hurst on 12/21/22 and comes in today bc she found a suture above her ear.    One nylon suture removed above pt's right ear.    Bettina Dyer RN  3/13/2023 10:45 AM

## 2023-08-17 ENCOUNTER — OFFICE VISIT (OUTPATIENT)
Dept: PLASTIC SURGERY | Facility: CLINIC | Age: 68
End: 2023-08-17

## 2023-08-17 DIAGNOSIS — Z41.1 ENCOUNTER FOR COSMETIC PROCEDURE: Primary | ICD-10-CM

## 2023-08-17 NOTE — LETTER
August 17, 2023  Re: Mesha Knight  1955    Dear Dr. Parkinson,    Thank you so much for referring Mesha Knight to the [facility]. I had the pleasure of visiting with Mesha today.     Attached you will find a copy of my note. Please feel free to reach out to me with any questions, [phone].     Lidia is back after her surgery.  She likes the improvement but feels she needs some additional volume.  I agree.  She has somewhat of a malar lid cheek junction depression on the right she likes some more volume over the malar submalar areas on both sides there is also 2 subtle depressions in the left mid cheek.  I would think about adding fat volume to these areas she would like the malar areas to be somewhat more prominent but does not want excessively high malar bones.  Given Dr. Mustafa's expertise I would like to do this case whether.  I discussed this with the patient.  We also discussed the risks and benefits of fat transfer particularly emphasizing the fact that the amount of fat survival is somewhat unpredictable.  There can be meaningful volume loss and on rare occasions some actual fat growth.  She would like to consider proceeding with surgery.  I would do it at the surgery center under general anesthesia we would harvest periumbilical fat and then injected.  Staff will arrange for the patient to meet Dr. Param frazier and I can look for a time to do this with her  JAVIER MURRAY MD        Your trust in our practice and care is much appreciated.    Sincerely,    JAVIER MURRAY MD

## 2023-08-17 NOTE — LETTER
8/17/2023       RE: Mesha Knight  [address]  [facility] [address]     Dear Colleague,    Thank you for referring your patient, Mesha Knight, to the  [facility] at [facility]. Please see a copy of my visit note below.    Lidia is back after her surgery.  She likes the improvement but feels she needs some additional volume.  I agree.  She has somewhat of a malar lid cheek junction depression on the right she likes some more volume over the malar submalar areas on both sides there is also 2 subtle depressions in the left mid cheek.  I would think about adding fat volume to these areas she would like the malar areas to be somewhat more prominent but does not want excessively high malar bones.  Given Dr. Mustafa's expertise I would like to do this case whether.  I discussed this with the patient.  We also discussed the risks and benefits of fat transfer particularly emphasizing the fact that the amount of fat survival is somewhat unpredictable.  There can be meaningful volume loss and on rare occasions some actual fat growth.  She would like to consider proceeding with surgery.  I would do it at the surgery center under general anesthesia we would harvest periumbilical fat and then injected.  Staff will arrange for the patient to meet Dr. Virgen back and I can look for a time to do this with her  JAVIER MURRAY MD      Again, thank you for allowing me to participate in the care of your patient.      Sincerely,    JAVIER MURRAY MD

## 2023-08-17 NOTE — PROGRESS NOTES
Lidia is back after her surgery.  She likes the improvement but feels she needs some additional volume.  I agree.  She has somewhat of a malar lid cheek junction depression on the right she likes some more volume over the malar submalar areas on both sides there is also 2 subtle depressions in the left mid cheek.  I would think about adding fat volume to these areas she would like the malar areas to be somewhat more prominent but does not want excessively high malar bones.  Given Dr. Mustafa's expertise I would like to do this case whether.  I discussed this with the patient.  We also discussed the risks and benefits of fat transfer particularly emphasizing the fact that the amount of fat survival is somewhat unpredictable.  There can be meaningful volume loss and on rare occasions some actual fat growth.  She would like to consider proceeding with surgery.  I would do it at the surgery center under general anesthesia we would harvest periumbilical fat and then injected.  Staff will arrange for the patient to meet Dr. Virgen back and I can look for a time to do this with her  JAVIER MURRAY MD

## 2023-08-23 ENCOUNTER — OFFICE VISIT (OUTPATIENT)
Dept: PLASTIC SURGERY | Facility: CLINIC | Age: 68
End: 2023-08-23

## 2023-08-23 DIAGNOSIS — Z41.1 ENCOUNTER FOR COSMETIC PROCEDURE: Primary | ICD-10-CM

## 2023-08-23 NOTE — Clinical Note
August 23, 2023  Re: Mesha LEWIS Eugene  1955    Dear Dr. Parkinson,    Thank you so much for referring Mesha LEWIS Isaiastiara to the [facility]. I had the pleasure of visiting with Mesha today.     Attached you will find a copy of my note. Please feel free to reach out to me with any questions, [phone].     No notes on file      Your trust in our practice and care is much appreciated.    Sincerely,  Sheri Mustafa MD

## 2023-08-23 NOTE — PROGRESS NOTES
Facial Plastic and Reconstructive Surgery Cosmetic Consultation    Referring Provider:   Referred Self, MD  No address on file    HPI:   I had the pleasure of seeing Mesha Knight today in clinic for consultation for surgical facial rejuvenation.    Mesha Knight is a 68 year old female. She has surgery with Dr. Hurst in 2022, including a face and neck lift. She feels she has some continued facial volume depletion and discussed facial fat grafting with Dr. Hurst and is here to further discuss.       PE:  Skin: Bullock 2  Facial Nerve Intact and facial movement symmetric  She has a nice result from her lift. She does have some midfacial and submalar volume loss.             IMPRESSION/PLAN: Mesha Knight is a 68 year old female here to discuss facial fat grafting. She is a great candidate. We would plan to do all over facial fat grafting with special attention to the midfacial and submalar regions. We discussed recovery and expectations. There is some question about how long the fat stays clinically meaningful and she understood that. Risks were discussed including but not limited to bleeding, infection, irregularities, asymmetry, need for additional procedures.Dr. Hurst and I are planning to do this case together.     Photodocumentation was obtained.

## 2023-08-23 NOTE — Clinical Note
8/23/2023       RE: Mesha Knight  [address]  [facility] [address]     Dear Colleague,    Thank you for referring your patient, Mesha Knight, to the [facility] at [facility]. Please see a copy of my visit note below.    No notes on file    Again, thank you for allowing me to participate in the care of your patient.      Sincerely,    Sheri Mustafa MD

## 2023-09-01 ENCOUNTER — ANCILLARY PROCEDURE (OUTPATIENT)
Dept: BONE DENSITY | Facility: CLINIC | Age: 68
End: 2023-09-01
Attending: OBSTETRICS & GYNECOLOGY
Payer: COMMERCIAL

## 2023-09-01 DIAGNOSIS — M85.80 OTHER SPECIFIED DISORDERS OF BONE DENSITY AND STRUCTURE, UNSPECIFIED SITE: ICD-10-CM

## 2023-09-01 PROCEDURE — 77080 DXA BONE DENSITY AXIAL: CPT | Performed by: INTERNAL MEDICINE

## 2023-09-07 ENCOUNTER — LAB REQUISITION (OUTPATIENT)
Dept: LAB | Facility: CLINIC | Age: 68
End: 2023-09-07

## 2023-09-07 DIAGNOSIS — Z13.220 ENCOUNTER FOR SCREENING FOR LIPOID DISORDERS: ICD-10-CM

## 2023-09-07 DIAGNOSIS — Z13.29 ENCOUNTER FOR SCREENING FOR OTHER SUSPECTED ENDOCRINE DISORDER: ICD-10-CM

## 2023-09-07 DIAGNOSIS — Z13.9 ENCOUNTER FOR SCREENING, UNSPECIFIED: ICD-10-CM

## 2023-09-07 LAB
ALBUMIN SERPL BCG-MCNC: 4.3 G/DL (ref 3.5–5.2)
ALP SERPL-CCNC: 67 U/L (ref 35–104)
ALT SERPL W P-5'-P-CCNC: 13 U/L (ref 0–50)
ANION GAP SERPL CALCULATED.3IONS-SCNC: 11 MMOL/L (ref 7–15)
AST SERPL W P-5'-P-CCNC: 24 U/L (ref 0–45)
BASOPHILS # BLD AUTO: 0.1 10E3/UL (ref 0–0.2)
BASOPHILS NFR BLD AUTO: 1 %
BILIRUB SERPL-MCNC: 0.3 MG/DL
BUN SERPL-MCNC: 14.9 MG/DL (ref 8–23)
CALCIUM SERPL-MCNC: 9.4 MG/DL (ref 8.8–10.2)
CHLORIDE SERPL-SCNC: 102 MMOL/L (ref 98–107)
CHOLEST SERPL-MCNC: 236 MG/DL
CREAT SERPL-MCNC: 0.8 MG/DL (ref 0.51–0.95)
DEPRECATED HCO3 PLAS-SCNC: 26 MMOL/L (ref 22–29)
EGFRCR SERPLBLD CKD-EPI 2021: 81 ML/MIN/1.73M2
EOSINOPHIL # BLD AUTO: 0.1 10E3/UL (ref 0–0.7)
EOSINOPHIL NFR BLD AUTO: 1 %
ERYTHROCYTE [DISTWIDTH] IN BLOOD BY AUTOMATED COUNT: 11.5 % (ref 10–15)
GLUCOSE SERPL-MCNC: 99 MG/DL (ref 70–99)
HCT VFR BLD AUTO: 39.1 % (ref 35–47)
HDLC SERPL-MCNC: 76 MG/DL
HGB BLD-MCNC: 12.4 G/DL (ref 11.7–15.7)
IMM GRANULOCYTES # BLD: 0 10E3/UL
IMM GRANULOCYTES NFR BLD: 0 %
LDLC SERPL CALC-MCNC: 136 MG/DL
LYMPHOCYTES # BLD AUTO: 2.5 10E3/UL (ref 0.8–5.3)
LYMPHOCYTES NFR BLD AUTO: 36 %
MCH RBC QN AUTO: 32.5 PG (ref 26.5–33)
MCHC RBC AUTO-ENTMCNC: 31.7 G/DL (ref 31.5–36.5)
MCV RBC AUTO: 103 FL (ref 78–100)
MONOCYTES # BLD AUTO: 0.7 10E3/UL (ref 0–1.3)
MONOCYTES NFR BLD AUTO: 10 %
NEUTROPHILS # BLD AUTO: 3.6 10E3/UL (ref 1.6–8.3)
NEUTROPHILS NFR BLD AUTO: 52 %
NONHDLC SERPL-MCNC: 160 MG/DL
NRBC # BLD AUTO: 0 10E3/UL
NRBC BLD AUTO-RTO: 0 /100
PLATELET # BLD AUTO: 264 10E3/UL (ref 150–450)
POTASSIUM SERPL-SCNC: 4.6 MMOL/L (ref 3.4–5.3)
PROT SERPL-MCNC: 7.1 G/DL (ref 6.4–8.3)
RBC # BLD AUTO: 3.81 10E6/UL (ref 3.8–5.2)
SODIUM SERPL-SCNC: 139 MMOL/L (ref 136–145)
TRIGL SERPL-MCNC: 120 MG/DL
TSH SERPL DL<=0.005 MIU/L-ACNC: 2.85 UIU/ML (ref 0.3–4.2)
WBC # BLD AUTO: 7 10E3/UL (ref 4–11)

## 2023-09-07 PROCEDURE — 80061 LIPID PANEL: CPT | Performed by: OBSTETRICS & GYNECOLOGY

## 2023-09-07 PROCEDURE — 84443 ASSAY THYROID STIM HORMONE: CPT | Performed by: OBSTETRICS & GYNECOLOGY

## 2023-09-07 PROCEDURE — 85025 COMPLETE CBC W/AUTO DIFF WBC: CPT | Performed by: OBSTETRICS & GYNECOLOGY

## 2023-09-07 PROCEDURE — 80053 COMPREHEN METABOLIC PANEL: CPT | Performed by: OBSTETRICS & GYNECOLOGY

## 2023-11-14 DIAGNOSIS — Z98.890 POSTOPERATIVE STATE: Primary | ICD-10-CM

## 2023-11-14 RX ORDER — VALACYCLOVIR HYDROCHLORIDE 500 MG/1
500 TABLET, FILM COATED ORAL 2 TIMES DAILY
Qty: 14 TABLET | Refills: 0 | Status: CANCELLED | OUTPATIENT
Start: 2023-11-14 | End: 2023-11-21

## 2023-11-14 RX ORDER — VALACYCLOVIR HYDROCHLORIDE 1 G/1
1000 TABLET, FILM COATED ORAL 2 TIMES DAILY
Qty: 14 TABLET | Refills: 0 | Status: SHIPPED | OUTPATIENT
Start: 2023-11-14 | End: 2023-11-21

## 2023-12-17 DIAGNOSIS — Z98.890 POSTOPERATIVE STATE: Primary | ICD-10-CM

## 2023-12-17 RX ORDER — CEPHALEXIN 500 MG/1
500 CAPSULE ORAL 3 TIMES DAILY
Qty: 21 CAPSULE | Refills: 0 | Status: SHIPPED | OUTPATIENT
Start: 2023-12-17 | End: 2023-12-24

## 2023-12-17 RX ORDER — OXYCODONE HYDROCHLORIDE 5 MG/1
5 TABLET ORAL EVERY 6 HOURS PRN
Qty: 20 TABLET | Refills: 0 | Status: SHIPPED | OUTPATIENT
Start: 2023-12-17

## 2023-12-17 RX ORDER — ONDANSETRON 4 MG/1
4 TABLET, ORALLY DISINTEGRATING ORAL EVERY 8 HOURS PRN
Qty: 12 TABLET | Refills: 1 | Status: SHIPPED | OUTPATIENT
Start: 2023-12-17

## 2023-12-17 RX ORDER — ACETAMINOPHEN 500 MG
500 TABLET ORAL EVERY 6 HOURS
Qty: 12 TABLET | Refills: 0 | Status: SHIPPED | OUTPATIENT
Start: 2023-12-17 | End: 2023-12-20

## 2023-12-18 RX ORDER — DEXAMETHASONE 4 MG/1
12 TABLET ORAL EVERY 8 HOURS
Qty: 6 TABLET | Refills: 0 | Status: SHIPPED | OUTPATIENT
Start: 2023-12-18 | End: 2023-12-19

## 2023-12-21 ENCOUNTER — TRANSFERRED RECORDS (OUTPATIENT)
Dept: HEALTH INFORMATION MANAGEMENT | Facility: CLINIC | Age: 68
End: 2023-12-21
Payer: COMMERCIAL

## 2023-12-28 ENCOUNTER — OFFICE VISIT (OUTPATIENT)
Dept: PLASTIC SURGERY | Facility: CLINIC | Age: 68
End: 2023-12-28
Payer: COMMERCIAL

## 2023-12-28 DIAGNOSIS — Z98.890 POSTOPERATIVE STATE: Primary | ICD-10-CM

## 2024-01-03 NOTE — PROGRESS NOTES
Saw Mesha s/p full face fat transfer (12/21/23).    She looks great and is healing well. She has no concerns. She reports she has had no pain and this has been a relatively easy process. She is appropriately edematous with slight bruising. We reviewed continued cares and realistic healing expectations. She is understanding of this. She is excited about her results. Pt instructed to reach out if questions or concerns arise.     Follow-up scheduled.    Precious Valles RN  1/3/2024 12:56 PM

## 2024-03-13 ENCOUNTER — OFFICE VISIT (OUTPATIENT)
Dept: PLASTIC SURGERY | Facility: CLINIC | Age: 69
End: 2024-03-13

## 2024-03-13 DIAGNOSIS — Z98.890 POSTOPERATIVE STATE: Primary | ICD-10-CM

## 2024-03-13 NOTE — LETTER
3/13/2024       RE: Mesha Knight  [address]  [facility] [address]     Dear Colleague,    Thank you for referring your patient, Mesha Knight, to the [facility] at [facility]. Please see a copy of my visit note below.    Updated photodocumentation obtained (see media tab).    Patient given quote for cosmetic lower eyelid blepharoplasty - transconj fat excision, at appointment today.     Quote was explained in detail, including but not limited to; a non-refundable deposit of 50% of the surgeon's fee is required to schedule, when/where payment is due, facility fees being subject to change, and six weeks minimum cancellation and reschedule notice. Patient verbalized understanding of quote. Signed quote was obtained (see media tab), a copy sent home with pt.     Education for quoted procedures was provided both verbally and in educational take home folder including pre & post op care, medications to avoid before and after surgery, and more.     All questions answered at this time. Pt instructed to reach out if questions arise.    Precious Valles RN  3/13/2024 3:28 PM      Ethan was in to see us after her surgery.  She is delighted with the outcome.  She says she has never looked this good in her entire adult life.  She has some fullness in the lower lids and I think a lower lid blepharoplasty fat removal is an option for her.  We talked about doing this in December and she will work with staff to make those arrangements.    Photographs were taken today.JAVIER MURRAY MD       Again, thank you for allowing me to participate in the care of your patient.      Sincerely,    JAVIER MURRAY MD

## 2024-03-13 NOTE — PROGRESS NOTES
Updated photodocumentation obtained (see media tab).    Patient given quote for cosmetic lower eyelid blepharoplasty - transconj fat excision, at appointment today.     Quote was explained in detail, including but not limited to; a non-refundable deposit of 50% of the surgeon's fee is required to schedule, when/where payment is due, facility fees being subject to change, and six weeks minimum cancellation and reschedule notice. Patient verbalized understanding of quote. Signed quote was obtained (see media tab), a copy sent home with pt.     Education for quoted procedures was provided both verbally and in educational take home folder including pre & post op care, medications to avoid before and after surgery, and more.     All questions answered at this time. Pt instructed to reach out if questions arise.    Precious Valles RN  3/13/2024 3:28 PM

## 2024-03-13 NOTE — LETTER
March 13, 2024  Re: Mesha LEWIS Katepool  1955    Dear Dr. Parkinson,    Thank you so much for referring Mesha LEWIS Isaiastiara to the [facility]. I had the pleasure of visiting with Mesha today.     Attached you will find a copy of my note. Please feel free to reach out to me with any questions, [phone].     Updated photodocumentation obtained (see media tab).    Patient given quote for cosmetic lower eyelid blepharoplasty - transconj fat excision, at appointment today.     Quote was explained in detail, including but not limited to; a non-refundable deposit of 50% of the surgeon's fee is required to schedule, when/where payment is due, facility fees being subject to change, and six weeks minimum cancellation and reschedule notice. Patient verbalized understanding of quote. Signed quote was obtained (see media tab), a copy sent home with pt.     Education for quoted procedures was provided both verbally and in educational take home folder including pre & post op care, medications to avoid before and after surgery, and more.     All questions answered at this time. Pt instructed to reach out if questions arise.    Precious Valles, RN  3/13/2024 3:28 PM      Ethan was in to see us after her surgery.  She is delighted with the outcome.  She says she has never looked this good in her entire adult life.  She has some fullness in the lower lids and I think a lower lid blepharoplasty fat removal is an option for her.  We talked about doing this in December and she will work with staff to make those arrangements.    Photographs were taken today.JAVIER MURRAY MD         Your trust in our practice and care is much appreciated.    Sincerely,    JAVIER MURRAY MD

## 2024-03-14 NOTE — PROGRESS NOTES
Ethan was in to see us after her surgery.  She is delighted with the outcome.  She says she has never looked this good in her entire adult life.  She has some fullness in the lower lids and I think a lower lid blepharoplasty fat removal is an option for her.  We talked about doing this in December and she will work with staff to make those arrangements.    Photographs were taken today.JAVIER MURRAY MD

## 2024-09-11 ENCOUNTER — OFFICE VISIT (OUTPATIENT)
Dept: PLASTIC SURGERY | Facility: CLINIC | Age: 69
End: 2024-09-11

## 2024-09-11 DIAGNOSIS — Z41.1 ENCOUNTER FOR COSMETIC PROCEDURE: Primary | ICD-10-CM

## 2024-09-11 NOTE — LETTER
9/11/2024       RE: Mesha Knight  [address]  [facility] [address]     Dear Colleague,    Thank you for referring your patient, Mesha Knight, to the  [facility] at [facility]. Please see a copy of my visit note below.    Was gracious patient was in to see us today preoperatively regarding her upcoming lower lid blepharoplasty.  She likes the volume addition from her fat transfer and I think is very gratifying.  I would do a trans conjunctival lower lid blepharoplasty.  We discussed risks and benefits including blindness ectropion etc.  I do not think she needs a canthopexy.  She might benefit from a chemical peel at another time.  If she has any more questions she will be in contact with us.JAVIER MURRAY MD       Again, thank you for allowing me to participate in the care of your patient.      Sincerely,    JAVIER MURRAY MD

## 2024-09-11 NOTE — LETTER
September 11, 2024  Re: Mesha Knight  1955    Dear Dr. Parkinson,    Thank you so much for referring Mesha Knight to the [facility]. I had the pleasure of visiting with Mesha today.     Attached you will find a copy of my note. Please feel free to reach out to me with any questions, [phone].     Was gracious patient was in to see us today preoperatively regarding her upcoming lower lid blepharoplasty.  She likes the volume addition from her fat transfer and I think is very gratifying.  I would do a trans conjunctival lower lid blepharoplasty.  We discussed risks and benefits including blindness ectropion etc.  I do not think she needs a canthopexy.  She might benefit from a chemical peel at another time.  If she has any more questions she will be in contact with us.JAVIER MURRAY MD         Your trust in our practice and care is much appreciated.    Sincerely,  JAVIER MURRAY MD

## 2024-09-11 NOTE — PROGRESS NOTES
Was gracious patient was in to see us today preoperatively regarding her upcoming lower lid blepharoplasty.  She likes the volume addition from her fat transfer and I think is very gratifying.  I would do a trans conjunctival lower lid blepharoplasty.  We discussed risks and benefits including blindness ectropion etc.  I do not think she needs a canthopexy.  She might benefit from a chemical peel at another time.  If she has any more questions she will be in contact with us.JAVIER MURRAY MD

## 2024-11-27 ENCOUNTER — TRANSFERRED RECORDS (OUTPATIENT)
Dept: HEALTH INFORMATION MANAGEMENT | Facility: CLINIC | Age: 69
End: 2024-11-27
Payer: COMMERCIAL

## 2024-11-27 DIAGNOSIS — Z41.1 ENCOUNTER FOR COSMETIC PROCEDURE: Primary | ICD-10-CM

## 2024-11-27 RX ORDER — ONDANSETRON 4 MG/1
4 TABLET, ORALLY DISINTEGRATING ORAL EVERY 8 HOURS PRN
Qty: 10 TABLET | Refills: 0 | Status: SHIPPED | OUTPATIENT
Start: 2024-11-27

## 2024-11-27 RX ORDER — TOBRAMYCIN AND DEXAMETHASONE 3; 1 MG/ML; MG/ML
1-2 SUSPENSION/ DROPS OPHTHALMIC EVERY 4 HOURS
Qty: 5 ML | Refills: 0 | Status: SHIPPED | OUTPATIENT
Start: 2024-11-27

## 2024-12-04 ENCOUNTER — OFFICE VISIT (OUTPATIENT)
Dept: PLASTIC SURGERY | Facility: CLINIC | Age: 69
End: 2024-12-04

## 2024-12-04 DIAGNOSIS — Z98.890 POSTOPERATIVE STATE: Primary | ICD-10-CM

## 2024-12-04 NOTE — PROGRESS NOTES
"-The patient was seen POD #7 following a lower lid blepharoplasty on 11/27/24.    -Cheek wires were removed at today's visit without incident.    -The patient has some slight under eye bruising bilaterally and swelling under the eyes as well, more prominent on the right side, but all within normal limits.     -The patient notes that she has not been wearing her contacts or glasses and has continued to work remotely on her computer, which she attributes to her slight \"dizzy/ imbalance\" symptoms she has been experiencing since the surgery.    -The patient notes that after she ices her under eyes, her dizzy/ imbalance symptoms seem to improve.  She will continue to ice as needed.     -The patient will follow up with Dr. Hurst on 12/26/24 and will call us sooner with any further questions.    Ale Alegria RN  12/4/2024 12:00 PM             "

## 2024-12-26 ENCOUNTER — OFFICE VISIT (OUTPATIENT)
Dept: PLASTIC SURGERY | Facility: CLINIC | Age: 69
End: 2024-12-26

## 2024-12-26 DIAGNOSIS — Z41.1 ENCOUNTER FOR COSMETIC PROCEDURE: Primary | ICD-10-CM

## 2024-12-26 NOTE — LETTER
December 26, 2024  Re: Mesha Knight  1955    Dear Dr. Parkinson,    Thank you so much for referring Mesha Knight to the [facility]. I had the pleasure of visiting with Mesha today.     Attached you will find a copy of my note. Please feel free to reach out to me with any questions, [phone].     Hilary is a 69-year-old female who returns now 1 month postop from her lower lid blepharoplasty.  She has done very well since surgery and is absolutely thrilled with her result.  She reports that she feels that her lower lids are entirely rejuvenated with a natural result and has made a world of difference in her overall countenance.  She denies any issues with tearing or pain or dry eye since the procedure.  Denies any irritation to the eye or change in vision.  She does report some dizziness which she feels stems from a feeling overlying her glabella.  She has not noticed any association with gaze or extraocular movements connected to her dizziness.    On exam she has a marked improvement in her lower lid fat herniation and nasojugal line.  The tear trough area as well augmented.  She has a very mild amount of residual swelling but overall her nasojugal line is well camouflaged with a youthful appearance to her lower lids.  She does not have any ectropion or lid laxity.    Assessment plan: Ethan is 1 month out from her lower lid blepharoplasty with a fantastic result.  We took photos at today's visit.  She may return to see us as needed for any issues or concerns if they should arise in the future.    Anmol Madera MD  Fellow, Facial Plastic and Reconstructive Surgery          Your trust in our practice and care is much appreciated.    Sincerely,  JAVIER MURRAY MD

## 2024-12-26 NOTE — LETTER
12/26/2024       RE: Mesha Knight  [address]  [facility] [address]     Dear Colleague,    Thank you for referring your patient, Mesha Knight, to the  [facility] at [facility]. Please see a copy of my visit note below.    Hilary is a 69-year-old female who returns now 1 month postop from her lower lid blepharoplasty.  She has done very well since surgery and is absolutely thrilled with her result.  She reports that she feels that her lower lids are entirely rejuvenated with a natural result and has made a world of difference in her overall countenance.  She denies any issues with tearing or pain or dry eye since the procedure.  Denies any irritation to the eye or change in vision.  She does report some dizziness which she feels stems from a feeling overlying her glabella.  She has not noticed any association with gaze or extraocular movements connected to her dizziness.    On exam she has a marked improvement in her lower lid fat herniation and nasojugal line.  The tear trough area as well augmented.  She has a very mild amount of residual swelling but overall her nasojugal line is well camouflaged with a youthful appearance to her lower lids.  She does not have any ectropion or lid laxity.    Assessment plan: Ethan is 1 month out from her lower lid blepharoplasty with a fantastic result.  We took photos at today's visit.  She may return to see us as needed for any issues or concerns if they should arise in the future.    Anmol Madera MD  Fellow, Facial Plastic and Reconstructive Surgery        Again, thank you for allowing me to participate in the care of your patient.      Sincerely,    JAVIER MURRAY MD

## 2024-12-26 NOTE — PROGRESS NOTES
Hilary is a 69-year-old female who returns now 1 month postop from her lower lid blepharoplasty.  She has done very well since surgery and is absolutely thrilled with her result.  She reports that she feels that her lower lids are entirely rejuvenated with a natural result and has made a world of difference in her overall countenance.  She denies any issues with tearing or pain or dry eye since the procedure.  Denies any irritation to the eye or change in vision.  She does report some dizziness which she feels stems from a feeling overlying her glabella.  She has not noticed any association with gaze or extraocular movements connected to her dizziness.    On exam she has a marked improvement in her lower lid fat herniation and nasojugal line.  The tear trough area as well augmented.  She has a very mild amount of residual swelling but overall her nasojugal line is well camouflaged with a youthful appearance to her lower lids.  She does not have any ectropion or lid laxity.    Assessment plan: Ethan is 1 month out from her lower lid blepharoplasty with a fantastic result.  We took photos at today's visit.  She may return to see us as needed for any issues or concerns if they should arise in the future.    Anmol Madera MD  Fellow, Facial Plastic and Reconstructive Surgery

## 2025-04-14 ENCOUNTER — HOSPITAL ENCOUNTER (EMERGENCY)
Facility: CLINIC | Age: 70
Discharge: HOME | End: 2025-04-14
Attending: STUDENT IN AN ORGANIZED HEALTH CARE EDUCATION/TRAINING PROGRAM | Admitting: STUDENT IN AN ORGANIZED HEALTH CARE EDUCATION/TRAINING PROGRAM
Payer: COMMERCIAL

## 2025-04-14 VITALS
BODY MASS INDEX: 20.93 KG/M2 | OXYGEN SATURATION: 98 % | TEMPERATURE: 96.9 F | HEART RATE: 69 BPM | RESPIRATION RATE: 17 BRPM | HEIGHT: 64 IN | WEIGHT: 122.58 LBS | SYSTOLIC BLOOD PRESSURE: 124 MMHG | DIASTOLIC BLOOD PRESSURE: 77 MMHG

## 2025-04-14 DIAGNOSIS — F41.0 PANIC ATTACK: ICD-10-CM

## 2025-04-14 DIAGNOSIS — R11.0 NAUSEA: ICD-10-CM

## 2025-04-14 DIAGNOSIS — F41.9 ANXIETY: Primary | ICD-10-CM

## 2025-04-14 PROBLEM — Z12.11 SCREENING FOR COLON CANCER: Status: ACTIVE | Noted: 2018-11-02

## 2025-04-14 PROBLEM — F32.A DEPRESSIVE DISORDER: Status: ACTIVE | Noted: 2022-11-22

## 2025-04-14 PROBLEM — F41.1 GAD (GENERALIZED ANXIETY DISORDER): Status: ACTIVE | Noted: 2025-04-14

## 2025-04-14 PROBLEM — F41.1 ANXIETY STATE: Status: ACTIVE | Noted: 2022-11-22

## 2025-04-14 PROBLEM — B00.1 COLD SORE: Status: ACTIVE | Noted: 2017-05-03

## 2025-04-14 PROCEDURE — 99284 EMERGENCY DEPT VISIT MOD MDM: CPT

## 2025-04-14 RX ORDER — HYDROXYZINE HYDROCHLORIDE 25 MG/1
25 TABLET, FILM COATED ORAL EVERY 4 HOURS PRN
Qty: 30 TABLET | Refills: 0 | Status: SHIPPED | OUTPATIENT
Start: 2025-04-14 | End: 2025-04-24

## 2025-04-14 RX ORDER — ONDANSETRON 4 MG/1
4 TABLET, FILM COATED ORAL EVERY 6 HOURS PRN
Qty: 10 TABLET | Refills: 0 | Status: SHIPPED | OUTPATIENT
Start: 2025-04-14 | End: 2025-04-18

## 2025-04-14 ASSESSMENT — COLUMBIA-SUICIDE SEVERITY RATING SCALE - C-SSRS
6. HAVE YOU EVER DONE ANYTHING, STARTED TO DO ANYTHING, OR PREPARED TO DO ANYTHING TO END YOUR LIFE?: NO
2. HAVE YOU ACTUALLY HAD ANY THOUGHTS OF KILLING YOURSELF IN THE PAST MONTH?: NO
1. IN THE PAST MONTH, HAVE YOU WISHED YOU WERE DEAD OR WISHED YOU COULD GO TO SLEEP AND NOT WAKE UP?: NO

## 2025-04-14 ASSESSMENT — ACTIVITIES OF DAILY LIVING (ADL)
ADLS_ACUITY_SCORE: 41
ADLS_ACUITY_SCORE: 41

## 2025-04-14 NOTE — ED TRIAGE NOTES
"Patient reports uncontrolled anxiety.  She reports changes in her work environment.  She reports going to therapy, changes in sleep, she is \"terrified to be there\".  She had been working from home for the last 15 years.  ABCs intact, A&Ox4.     Triage Assessment (Adult)       Row Name 04/14/25 1222          Triage Assessment    Airway WDL WDL        Respiratory WDL    Respiratory WDL WDL        Skin Circulation/Temperature WDL    Skin Circulation/Temperature WDL WDL        Cardiac WDL    Cardiac WDL WDL        Peripheral/Neurovascular WDL    Peripheral Neurovascular WDL WDL        Cognitive/Neuro/Behavioral WDL    Cognitive/Neuro/Behavioral WDL WDL                     "

## 2025-04-14 NOTE — ED PROVIDER NOTES
"  Emergency Department Note      History of Present Illness     Chief Complaint   Anxiety      HPI   Mesha Knight is a very pleasant 70 year old female with a history of anxiety, depression and hyperlipidemia presenting with anxiety. 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 She states the past 2 weeks she has been going to the office and experiencing anxiety.  When she is at home, she is feeling fine, but symptoms started as soon as she gets ready to go to work. Symptoms include nausea, dry heaving, diarrhea, difficulty sleeping. Her NP increased sertraline from 75 mg to 100 mg. The patient does not take any medication for sleep. The patient denies any suicidal ideation.     Independent Historian   None    Review of External Notes   None. I personally reviewed notes from the patient's nurse triage line call dated today. This provided me with information regarding patient's recent clinical course.     Past Medical History     Medical History and Problem List   Hyperlipidemia  Anxiety  Depression  Enthesopathy of knee     Medications   Valtrex  Zoloft  Citrucel  Decadron  Lidocaine ointment  Celebrex  Abilify    Surgical History   Hysterectomy    Physical Exam     Patient Vitals for the past 24 hrs:   BP Temp Temp src Pulse Resp SpO2 Height Weight   04/14/25 1522 124/77 -- -- 69 17 98 % -- --   04/14/25 1225 (!) 136/98 96.9  F (36.1  C) Temporal 80 19 98 % 1.626 m (5' 4\") 55.6 kg (122 lb 9.2 oz)     Physical Exam  General: Alert, pleasant, patient of stated age  HENT: Atraumatic, normocephalic  Eyes: Extraocular movements intact  Cardiovascular: Regular rate and rhythm, S1-S2, no murmurs gallops or rubs  Pulmonary: Easy work of breathing  Abdomen: Soft, nontender  Skin: Warm and dry  Neuro: Alert and oriented  Psych: Cooperative, anxious affect, congruent mood, denies suicidal ideation    Diagnostics     Lab Results   Labs Ordered and Resulted " from Time of ED Arrival to Time of ED Departure - No data to display    Imaging   No orders to display       EKG   No ECG performed.     Independent Interpretation   None    ED Course      Medications Administered   Medications - No data to display    Procedures   Procedures   None performed    Discussion of Management   1451 I spoke with DEC , regarding the patient's presentation, findings, and plan of care.         ED Course   ED Course as of 04/14/25 1535   Mon Apr 14, 2025   1451 I spoke with DEC , regarding the patient's presentation, findings, and plan of care.         Additional Documentation  None    Medical Decision Making / Diagnosis     CMS Diagnoses: None    MIPS       None    MDM   Patient presenting with anxiety, and symptoms including nausea, diarrhea.  Vital signs are reassuring.  Examination is unremarkable.  Patient is asymptomatic in terms of physical symptoms at this time.  It seems she had to get physical symptoms when she is experiencing anxiety related to her job.  I have low suspicion for alternative organic etiology given time course of symptoms and resolution when the patient is at home.  I did obtain a DEC assessment and this was helpful.  Patient does not have any suicidal ideation.  No safety concerns at this time.  Will plan patient be discharged with hydroxyzine as needed for anxiety.  Encouraged her to follow-up with her primary care clinician for reevaluation within the next week.  Also prescribed a short course of ondansetron for nausea since the patient has found this helpful. Return precautions provided.     Disposition   The patient was discharged.     Diagnosis     ICD-10-CM    1. Anxiety  F41.9       2. Panic attack  F41.0       3. Nausea  R11.0            Discharge Medications   New Prescriptions    HYDROXYZINE HCL (ATARAX) 25 MG TABLET    Take 1 tablet (25 mg) by mouth every 4 hours as needed for anxiety.    ONDANSETRON (ZOFRAN) 4 MG TABLET    Take 1 tablet (4  mg) by mouth every 6 hours as needed for nausea.       Scribe Disclosure:  I, Wendy Goodrich, am serving as a scribe at 12:09 PM on 4/14/2025 to document services personally performed by Pawan Casey MD based on my observations and the provider's statements to me.      Pawan Casey MD  04/14/25 1536
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
## 2025-04-14 NOTE — DISCHARGE INSTRUCTIONS
Writer encourage Pt to take her medications as prescribed and keep all of scheduled appointments with her outpatient service providers.  Writer recommended Pt to follow up with her current outpatient psychiatry for medication management and increase her current therapy service to 1x weekly from 1x every other week.  DEC coordinator will contact Pt within next 1 or 2 business days to ensure coordination of care and provide assistance with appointments.    Below is a list of FREE Mental Health Options in the [facility]:    [facility]  Serves those in emotional crisis with 24-hour, seven-day-a-week crisis counseling, assessment, referral, and medication management.   Suicidal: [phone] Consultation: [phone]  [address] [place] [address] 24/7 [facility]     [facility]  [phone]  Serves those in need of free outpatient mental health care  Hours: Mon, Wed, Fri 1-3pm; Mon-Thurs 6:30-8:30pm    [facility]  [address] [place] [id number]  [phone]

## 2025-04-16 ENCOUNTER — TELEPHONE (OUTPATIENT)
Dept: BEHAVIORAL HEALTH | Facility: CLINIC | Age: 70
End: 2025-04-16
Payer: COMMERCIAL